# Patient Record
Sex: FEMALE | Race: WHITE | Employment: UNEMPLOYED | URBAN - METROPOLITAN AREA
[De-identification: names, ages, dates, MRNs, and addresses within clinical notes are randomized per-mention and may not be internally consistent; named-entity substitution may affect disease eponyms.]

---
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
## 2023-10-05 ENCOUNTER — OFFICE VISIT (OUTPATIENT)
Dept: OBGYN | Age: 18
End: 2023-10-05
Payer: MEDICAID

## 2023-10-05 ENCOUNTER — HOSPITAL ENCOUNTER (OUTPATIENT)
Age: 18
Setting detail: SPECIMEN
Discharge: HOME | End: 2023-10-05
Payer: MEDICAID

## 2023-10-05 VITALS
SYSTOLIC BLOOD PRESSURE: 118 MMHG | HEIGHT: 67 IN | BODY MASS INDEX: 26.37 KG/M2 | WEIGHT: 168 LBS | DIASTOLIC BLOOD PRESSURE: 76 MMHG

## 2023-10-05 DIAGNOSIS — N89.8 VAGINAL ITCHING: ICD-10-CM

## 2023-10-05 DIAGNOSIS — N89.8 VAGINAL ITCHING: Primary | ICD-10-CM

## 2023-10-05 PROCEDURE — 87660 TRICHOMONAS VAGIN DIR PROBE: CPT

## 2023-10-05 PROCEDURE — 87510 GARDNER VAG DNA DIR PROBE: CPT

## 2023-10-05 PROCEDURE — 99213 OFFICE O/P EST LOW 20 MIN: CPT

## 2023-10-05 PROCEDURE — 87480 CANDIDA DNA DIR PROBE: CPT

## 2023-10-05 PROCEDURE — 1036F TOBACCO NON-USER: CPT

## 2023-10-05 PROCEDURE — 87491 CHLMYD TRACH DNA AMP PROBE: CPT

## 2023-10-05 PROCEDURE — 87591 N.GONORRHOEAE DNA AMP PROB: CPT

## 2023-10-05 PROCEDURE — G8419 CALC BMI OUT NRM PARAM NOF/U: HCPCS

## 2023-10-05 PROCEDURE — G8484 FLU IMMUNIZE NO ADMIN: HCPCS

## 2023-10-05 PROCEDURE — G8427 DOCREV CUR MEDS BY ELIG CLIN: HCPCS

## 2023-10-05 ASSESSMENT — ENCOUNTER SYMPTOMS
SHORTNESS OF BREATH: 0
CONSTIPATION: 0
NAUSEA: 0
ABDOMINAL PAIN: 0
COUGH: 0
CHEST TIGHTNESS: 0
VOMITING: 0
COLOR CHANGE: 0
ABDOMINAL DISTENTION: 0
WHEEZING: 0
DIARRHEA: 0

## 2023-10-05 NOTE — PROGRESS NOTES
CHIEF COMPLAINT:     Chief Complaint   Patient presents with    Vaginal Itching     Patient presents today for vaginal itching and states it feels \"inflamed\". Symptoms for a few days now. HPI:   Alonso Shaw presents today with concerns for vaginal itching for the past couple of days. She reports a small amount of yellow/white discharge and a slight odor. She reports nothing makes it worse or better and she has not tried anything for this at home. She reports she does not have any history of vaginal infections or STIs. She is sexually active with one male partner. She is currently on an OCP. GYNECOLOGIC HISTORY:     Menopause: NA  Patient's last menstrual period was 09/06/2023. Last pap: NA    Sexually active: Yes  New sexual partner: no    STD history: No    Hormone Replacement: no    Birth control method :Yes  Permanent Sterilization: No      REVIEW OF SYSTEMS:  Review of Systems   Constitutional:  Negative for activity change, chills, diaphoresis, fatigue and fever. HENT:  Negative for congestion. Respiratory:  Negative for cough, chest tightness, shortness of breath and wheezing. Cardiovascular:  Negative for chest pain, palpitations and leg swelling. Gastrointestinal:  Negative for abdominal distention, abdominal pain, constipation, diarrhea, nausea and vomiting. Genitourinary:  Positive for vaginal discharge (itching and odor). Negative for dysuria, frequency, hematuria and urgency. Musculoskeletal:  Negative for arthralgias. Skin:  Negative for color change. Neurological:  Negative for dizziness, speech difficulty, weakness, light-headedness, numbness and headaches. Psychiatric/Behavioral:  Negative for agitation, behavioral problems, confusion, dysphoric mood, self-injury and suicidal ideas. The patient is not nervous/anxious.         PHYSICAL EXAM:  Constitutional:   Blood pressure 118/76, height 5' 7\" (1.702 m), weight 168 lb (76.2 kg), last menstrual period 09/06/2023, not

## 2023-10-06 ENCOUNTER — TELEPHONE (OUTPATIENT)
Dept: OBGYN | Age: 18
End: 2023-10-06

## 2023-10-06 LAB
C TRACH DNA SPEC QL PROBE+SIG AMP: NEGATIVE
CANDIDA SPECIES: POSITIVE
GARDNERELLA VAGINALIS: POSITIVE
N GONORRHOEA DNA SPEC QL PROBE+SIG AMP: NEGATIVE
SOURCE: ABNORMAL
SPECIMEN DESCRIPTION: NORMAL
TRICHOMONAS: NEGATIVE

## 2023-10-06 RX ORDER — FLUCONAZOLE 150 MG/1
150 TABLET ORAL ONCE
Qty: 1 TABLET | Refills: 0 | Status: SHIPPED | OUTPATIENT
Start: 2023-10-06 | End: 2023-10-06

## 2023-10-06 RX ORDER — METRONIDAZOLE 500 MG/1
500 TABLET ORAL 2 TIMES DAILY
Qty: 14 TABLET | Refills: 0 | Status: SHIPPED | OUTPATIENT
Start: 2023-10-06 | End: 2023-10-13

## 2023-10-06 NOTE — TELEPHONE ENCOUNTER
Pt called in in regards to [facility] I can see they have been reviewed by Ashley Braxton and meds have been sent. I went over the results with the patient, she voiced understanding and will  the medications.

## 2023-11-20 ENCOUNTER — TELEPHONE (OUTPATIENT)
Dept: OBGYN | Age: 18
End: 2023-11-20

## 2023-11-20 DIAGNOSIS — O20.9 VAGINAL BLEEDING IN PREGNANCY, FIRST TRIMESTER: Primary | ICD-10-CM

## 2023-11-20 NOTE — TELEPHONE ENCOUNTER
Patient called stating she is approximately 7 weeks and had some spotting over the weekend. Patient desires quants to check on pregnancy.

## 2023-11-29 ENCOUNTER — INITIAL PRENATAL (OUTPATIENT)
Dept: OBGYN | Age: 18
End: 2023-11-29
Payer: MEDICAID

## 2023-11-29 ENCOUNTER — HOSPITAL ENCOUNTER (OUTPATIENT)
Age: 18
Setting detail: SPECIMEN
Discharge: HOME | End: 2023-11-29
Payer: MEDICAID

## 2023-11-29 VITALS — SYSTOLIC BLOOD PRESSURE: 106 MMHG | BODY MASS INDEX: 26.78 KG/M2 | WEIGHT: 171 LBS | DIASTOLIC BLOOD PRESSURE: 62 MMHG

## 2023-11-29 DIAGNOSIS — Z34.81 ENCOUNTER FOR SUPERVISION OF OTHER NORMAL PREGNANCY IN FIRST TRIMESTER: ICD-10-CM

## 2023-11-29 DIAGNOSIS — Z3A.08 8 WEEKS GESTATION OF PREGNANCY: ICD-10-CM

## 2023-11-29 DIAGNOSIS — Z32.01 POSITIVE URINE PREGNANCY TEST: ICD-10-CM

## 2023-11-29 DIAGNOSIS — N91.2 AMENORRHEA: ICD-10-CM

## 2023-11-29 DIAGNOSIS — Z34.81 ENCOUNTER FOR SUPERVISION OF OTHER NORMAL PREGNANCY IN FIRST TRIMESTER: Primary | ICD-10-CM

## 2023-11-29 LAB
ABO + RH BLD: NORMAL
BLOOD GROUP ANTIBODIES SERPL: NEGATIVE

## 2023-11-29 PROCEDURE — G8419 CALC BMI OUT NRM PARAM NOF/U: HCPCS | Performed by: OBSTETRICS & GYNECOLOGY

## 2023-11-29 PROCEDURE — 86900 BLOOD TYPING SEROLOGIC ABO: CPT

## 2023-11-29 PROCEDURE — 85025 COMPLETE CBC W/AUTO DIFF WBC: CPT

## 2023-11-29 PROCEDURE — 99211 OFF/OP EST MAY X REQ PHY/QHP: CPT | Performed by: OBSTETRICS & GYNECOLOGY

## 2023-11-29 PROCEDURE — 87086 URINE CULTURE/COLONY COUNT: CPT

## 2023-11-29 PROCEDURE — 86780 TREPONEMA PALLIDUM: CPT

## 2023-11-29 PROCEDURE — 86762 RUBELLA ANTIBODY: CPT

## 2023-11-29 PROCEDURE — 86901 BLOOD TYPING SEROLOGIC RH(D): CPT

## 2023-11-29 PROCEDURE — G8427 DOCREV CUR MEDS BY ELIG CLIN: HCPCS | Performed by: OBSTETRICS & GYNECOLOGY

## 2023-11-29 PROCEDURE — 86803 HEPATITIS C AB TEST: CPT

## 2023-11-29 PROCEDURE — 87591 N.GONORRHOEAE DNA AMP PROB: CPT

## 2023-11-29 PROCEDURE — 87340 HEPATITIS B SURFACE AG IA: CPT

## 2023-11-29 PROCEDURE — H1000 PRENATAL CARE ATRISK ASSESSM: HCPCS | Performed by: OBSTETRICS & GYNECOLOGY

## 2023-11-29 PROCEDURE — 36415 COLL VENOUS BLD VENIPUNCTURE: CPT | Performed by: OBSTETRICS & GYNECOLOGY

## 2023-11-29 PROCEDURE — 87491 CHLMYD TRACH DNA AMP PROBE: CPT

## 2023-11-29 PROCEDURE — 86850 RBC ANTIBODY SCREEN: CPT

## 2023-11-29 PROCEDURE — 87389 HIV-1 AG W/HIV-1&-2 AB AG IA: CPT

## 2023-11-29 NOTE — PROGRESS NOTES
Retardation/Autism Yes Pt's aunt with Autism    Congenital Heart Defect No     Was Person Treated for Fragilex? No     Down Syndrome No     Other Inherited Genetic Chromosomal Disorder? No     Van-Sachs No     Maternal Metabolic Disorder No     Canavan Disease No     Patient or Baby's Father Had Other Defects? No     Familial dysautonomia No     Recurrent Pregnancy Loss or Still Birth? No     Sickle Cell Disease or Trait No     Medications (including supplements, vitamins, herbs or OTC drugs) / illicit / recreational drugs / alcohol since last menstrual period No     Hemophilia No         If Yes, agent(s) and strength/dosage      Muscular Dystrophy No        OB Infection History    Blood Type O Positive     Patient or partner has Hepatitis C No     Live with Someone with or Exposed to TB? No     History of STD/GC/Chlamydia/HPV/Syphilis? No     Patient or Partner has Hx of Genital Herpes? No     History of Chickenpox No     History of MRSA No     Risk of Toxoplasmosis Yes     Risk of CMV No     List of other infections      Rash or Viral Illness Since LMP? No     Additional comments      Patient or partner has Hepatitis B No         Breastfeeding handouts given and reviewed: Yes     If BMI over 35 was HgA1C drawn: N/A     Does pt have a history gestational diabetes :  N/A   If yes did we draw HgA1C: N/A     If history of thyroid problem was TSH drawn: N/A       My chart set up and activated: Yes    If history of preeclampsia did we start baby ASA: N/A    If history of  delivery - did we set up progesterone injections:  N/A    Does pt have a history of DVT? no  If yes start Lovenox 40 mg daily    Is pt allergic to PCN? No:   If yes order U/A to culture and sensitivity if positive. Assessment:      Diagnosis Orders   1.  Encounter for supervision of other normal pregnancy in first trimester  C.trachomatis N.gonorrhoeae DNA, Urine    Culture, Urine    Hepatitis C Antibody    HIV Screen    Prenatal Profile I

## 2023-11-30 LAB
BASOPHILS # BLD: 0.03 K/UL (ref 0–0.2)
BASOPHILS NFR BLD: 0 % (ref 0–2)
CHLAMYDIA DNA UR QL NAA+PROBE: NEGATIVE
EOSINOPHIL # BLD: 0.05 K/UL (ref 0–0.44)
EOSINOPHILS RELATIVE PERCENT: 1 % (ref 1–4)
ERYTHROCYTE [DISTWIDTH] IN BLOOD BY AUTOMATED COUNT: 12.5 % (ref 11.8–14.4)
HBV SURFACE AG SERPL QL IA: NONREACTIVE
HCT VFR BLD AUTO: 38.5 % (ref 36.3–47.1)
HCV AB SERPL QL IA: NONREACTIVE
HGB BLD-MCNC: 13.2 G/DL (ref 11.9–15.1)
HIV 1+2 AB+HIV1 P24 AG SERPL QL IA: NONREACTIVE
IMM GRANULOCYTES # BLD AUTO: <0.03 K/UL (ref 0–0.3)
IMM GRANULOCYTES NFR BLD: 0 %
LYMPHOCYTES NFR BLD: 1.38 K/UL (ref 1.2–5.2)
LYMPHOCYTES RELATIVE PERCENT: 19 % (ref 25–45)
MCH RBC QN AUTO: 30.3 PG (ref 25.2–33.5)
MCHC RBC AUTO-ENTMCNC: 34.3 G/DL (ref 28.4–34.8)
MCV RBC AUTO: 88.5 FL (ref 82.6–102.9)
MICROORGANISM SPEC CULT: NORMAL
MONOCYTES NFR BLD: 0.55 K/UL (ref 0.1–1.4)
MONOCYTES NFR BLD: 8 % (ref 2–8)
N GONORRHOEA DNA UR QL NAA+PROBE: NEGATIVE
NEUTROPHILS NFR BLD: 72 % (ref 34–64)
NEUTS SEG NFR BLD: 5.16 K/UL (ref 1.8–8)
NRBC BLD-RTO: 0 PER 100 WBC
PLATELET # BLD AUTO: 263 K/UL (ref 138–453)
PMV BLD AUTO: 9.8 FL (ref 8.1–13.5)
RBC # BLD AUTO: 4.35 M/UL (ref 3.95–5.11)
RUBV IGG SERPL QL IA: 122.8 IU/ML
SPECIMEN DESCRIPTION: NORMAL
SPECIMEN DESCRIPTION: NORMAL
T PALLIDUM AB SER QL IA: NONREACTIVE
WBC OTHER # BLD: 7.2 K/UL (ref 4.5–13.5)

## 2024-01-02 ENCOUNTER — ROUTINE PRENATAL (OUTPATIENT)
Dept: OBGYN | Age: 19
End: 2024-01-02
Payer: MEDICAID

## 2024-01-02 VITALS — SYSTOLIC BLOOD PRESSURE: 112 MMHG | WEIGHT: 168 LBS | DIASTOLIC BLOOD PRESSURE: 68 MMHG | BODY MASS INDEX: 26.31 KG/M2

## 2024-01-02 DIAGNOSIS — Z34.82 ENCOUNTER FOR SUPERVISION OF OTHER NORMAL PREGNANCY IN SECOND TRIMESTER: ICD-10-CM

## 2024-01-02 DIAGNOSIS — Z33.2: ICD-10-CM

## 2024-01-02 DIAGNOSIS — Z3A.12 12 WEEKS GESTATION OF PREGNANCY: Primary | ICD-10-CM

## 2024-01-02 PROCEDURE — 1036F TOBACCO NON-USER: CPT | Performed by: OBSTETRICS & GYNECOLOGY

## 2024-01-02 PROCEDURE — G8427 DOCREV CUR MEDS BY ELIG CLIN: HCPCS | Performed by: OBSTETRICS & GYNECOLOGY

## 2024-01-02 PROCEDURE — G8484 FLU IMMUNIZE NO ADMIN: HCPCS | Performed by: OBSTETRICS & GYNECOLOGY

## 2024-01-02 PROCEDURE — 99213 OFFICE O/P EST LOW 20 MIN: CPT | Performed by: OBSTETRICS & GYNECOLOGY

## 2024-01-02 PROCEDURE — G8419 CALC BMI OUT NRM PARAM NOF/U: HCPCS | Performed by: OBSTETRICS & GYNECOLOGY

## 2024-01-09 ENCOUNTER — ROUTINE PRENATAL (OUTPATIENT)
Dept: OBGYN | Age: 19
End: 2024-01-09
Payer: MEDICAID

## 2024-01-09 VITALS — WEIGHT: 168.8 LBS | BODY MASS INDEX: 26.44 KG/M2 | DIASTOLIC BLOOD PRESSURE: 64 MMHG | SYSTOLIC BLOOD PRESSURE: 108 MMHG

## 2024-01-09 DIAGNOSIS — O02.1 MISSED AB: Primary | ICD-10-CM

## 2024-01-09 PROCEDURE — G8484 FLU IMMUNIZE NO ADMIN: HCPCS | Performed by: OBSTETRICS & GYNECOLOGY

## 2024-01-09 PROCEDURE — G8419 CALC BMI OUT NRM PARAM NOF/U: HCPCS | Performed by: OBSTETRICS & GYNECOLOGY

## 2024-01-09 PROCEDURE — 99213 OFFICE O/P EST LOW 20 MIN: CPT | Performed by: OBSTETRICS & GYNECOLOGY

## 2024-01-09 PROCEDURE — G8427 DOCREV CUR MEDS BY ELIG CLIN: HCPCS | Performed by: OBSTETRICS & GYNECOLOGY

## 2024-01-09 PROCEDURE — 1036F TOBACCO NON-USER: CPT | Performed by: OBSTETRICS & GYNECOLOGY

## 2024-01-09 RX ORDER — KETOROLAC TROMETHAMINE 10 MG/1
10 TABLET, FILM COATED ORAL EVERY 6 HOURS PRN
Qty: 12 TABLET | Refills: 0 | Status: SHIPPED | OUTPATIENT
Start: 2024-01-09

## 2024-01-09 RX ORDER — MISOPROSTOL 200 UG/1
600 TABLET ORAL ONCE
Qty: 6 TABLET | Refills: 0 | Status: SHIPPED | OUTPATIENT
Start: 2024-01-09 | End: 2024-01-09

## 2024-01-09 ASSESSMENT — ENCOUNTER SYMPTOMS: NAUSEA: 1

## 2024-01-09 NOTE — PROGRESS NOTES
DATE OF VISIT:  1/9/24    PATIENT NAME:  Mckenna Adams     YOB: 2005    REASON FOR VISIT:    Chief Complaint   Patient presents with    Miscarriage     Patient is being seen after US.  She denies any bleeding or cramping at this time.          HISTORY OF PRESENT ILLNESS:  Rpt usn today for viability - no growth in CRL and no FHTs; pt denies cramping or bleeding; disc'd medication v d&e for treatment v expectant management; pt would like to try cytotec        Patient's last menstrual period was 10/01/2023.  Vitals:    01/09/24 1419   BP: 108/64   Weight: 76.6 kg (168 lb 12.8 oz)     Body mass index is 26.44 kg/m².  No Known Allergies  Current Outpatient Medications   Medication Sig Dispense Refill    ketorolac (TORADOL) 10 MG tablet Take 1 tablet by mouth every 6 hours as needed for Pain 12 tablet 0    miSOPROStol (CYTOTEC) 200 MCG tablet Take 3 tablets by mouth once for 1 dose May repeat dose in 24 hours if no bleeding after first dose. 6 tablet 0    Prenatal MV & Min w/FA-DHA ([facility] PO) Take by mouth       No current facility-administered medications for this visit.     Social History     Socioeconomic History    Marital status: Single   Tobacco Use    Smoking status: Never     Passive exposure: Yes    Smokeless tobacco: Never   Vaping Use    Vaping Use: Never used   Substance and Sexual Activity    Alcohol use: No     Alcohol/week: 0.0 standard drinks of alcohol    Drug use: No    Sexual activity: Yes     Partners: Male     Birth control/protection: Condom, Pill       REVIEW OF SYSTEMS:  Review of Systems   Constitutional:  Negative for chills and fever.   Gastrointestinal:  Positive for nausea.   Genitourinary:  Negative for pelvic pain, vaginal bleeding and vaginal discharge.       PHYSICAL EXAM:  /64   Wt 76.6 kg (168 lb 12.8 oz)   LMP 10/01/2023   BMI 26.44 kg/m²   Physical Exam  Constitutional:       Appearance: Normal appearance.   HENT:      Head: Normocephalic and

## 2024-01-17 ENCOUNTER — OFFICE VISIT (OUTPATIENT)
Dept: OBGYN CLINIC | Age: 19
End: 2024-01-17

## 2024-01-17 ENCOUNTER — TELEPHONE (OUTPATIENT)
Dept: OBGYN CLINIC | Age: 19
End: 2024-01-17

## 2024-01-17 VITALS — WEIGHT: 166 LBS | BODY MASS INDEX: 26 KG/M2 | SYSTOLIC BLOOD PRESSURE: 116 MMHG | DIASTOLIC BLOOD PRESSURE: 58 MMHG

## 2024-01-17 DIAGNOSIS — O03.4 INCOMPLETE ABORTION: Primary | ICD-10-CM

## 2024-01-17 RX ORDER — TRANEXAMIC ACID 650 MG/1
1300 TABLET ORAL 3 TIMES DAILY
Status: ON HOLD | COMMUNITY
Start: 2024-01-16 | End: 2024-01-19 | Stop reason: HOSPADM

## 2024-01-17 NOTE — PROGRESS NOTES
with her at the completion of her visit.    ASSESSMENT:      1. Incomplete         PLAN:  No orders of the defined types were placed in this encounter.    Return in about 2 days (around 2024) for suction d&e with usn.       Electronically signed by Chelly Griffin DO on 24

## 2024-01-17 NOTE — H&P (VIEW-ONLY)
DATE OF VISIT:  1/17/24    PATIENT NAME:  Mckenna Adams     YOB: 2005    REASON FOR VISIT:    Chief Complaint   Patient presents with    Follow-up     Patient here for USN follow-up. Patient took Cytotec on Friday.  She had a lot of bleeding on Saturday and was pale and dizzy and large blood clots.  Yesterday she returned to ER because she was having softball sized blood clots. Today bleeding is period like, with just one blood clot that was smaller than a golf ball.         HISTORY OF PRESENT ILLNESS:  Pt with missed ab noted last week; given cytotec; took last fri; went to ED with pain sat and discharged home; went to ED with bleeding last night; hgb had dropped to 9.7 but pt was stable and sent home again; states bleeding has slowed to period like; usn still shows thickened endometrium this am 29 mm (35 mm last gumaro) but still with doppler flow noted; disc'd d&e to evacuate clot and debris; lysteda that was prescribed still not available at pharmacy; pt agreeable to d&e; disc'd procedure; r/b/a reviewed; restrictions and recovery disc'd        Patient's last menstrual period was 10/01/2023.  Vitals:    01/17/24 1031   BP: (!) 116/58   Site: Right Upper Arm   Position: Sitting   Weight: 75.3 kg (166 lb)     Body mass index is 26 kg/m².  No Known Allergies  Current Outpatient Medications   Medication Sig Dispense Refill    Prenatal MV & Min w/FA-DHA ([facility] GUM[facility] PO) Take by mouth      tranexamic acid (LYSTEDA) 650 MG TABS tablet Take 2 tablets by mouth 3 times daily (Patient not taking: Reported on 1/17/2024)      ketorolac (TORADOL) 10 MG tablet Take 1 tablet by mouth every 6 hours as needed for Pain (Patient not taking: Reported on 1/17/2024) 12 tablet 0    miSOPROStol (CYTOTEC) 200 MCG tablet Take 3 tablets by mouth once for 1 dose May repeat dose in 24 hours if no bleeding after first dose. 6 tablet 0     No current facility-administered medications for this visit.     Social History  with her at the completion of her visit.    ASSESSMENT:      1. Incomplete         PLAN:  No orders of the defined types were placed in this encounter.    Return in about 2 days (around 2024) for suction d&e with usn.       Electronically signed by Chelly Griffin DO on 24

## 2024-01-17 NOTE — TELEPHONE ENCOUNTER
Patient was in the office today to see Dr. Ramos for a miscarriage.  She is wanting to proceed with a D&C.  Dr. Ramos signed surgery consents with patient and notified her that surgery would be on 1/19.  I attempted to call patient to confirm surgery details and had to leave a message.  She is scheduled for an Ultrasound Guided D&C at [facility] on 1/19/2024.  She is aware that a nurse from [facility] will call her to complete a phone interview and arrange COVID testing if needed.  She will let me know if she is needing a note for work.  We will also follow up 2-3 weeks after surgery. Patient to call back to schedule.  Patient to call with any further questions/concerns.

## 2024-01-18 ENCOUNTER — ANESTHESIA EVENT (OUTPATIENT)
Dept: OPERATING ROOM | Age: 19
End: 2024-01-18
Payer: MEDICAID

## 2024-01-19 ENCOUNTER — HOSPITAL ENCOUNTER (OUTPATIENT)
Age: 19
Setting detail: OUTPATIENT SURGERY
Discharge: HOME | End: 2024-01-19
Attending: OBSTETRICS & GYNECOLOGY | Admitting: OBSTETRICS & GYNECOLOGY
Payer: MEDICAID

## 2024-01-19 ENCOUNTER — ANESTHESIA (OUTPATIENT)
Dept: OPERATING ROOM | Age: 19
End: 2024-01-19
Payer: MEDICAID

## 2024-01-19 VITALS
HEART RATE: 80 BPM | WEIGHT: 161.2 LBS | SYSTOLIC BLOOD PRESSURE: 102 MMHG | BODY MASS INDEX: 25.3 KG/M2 | TEMPERATURE: 98.7 F | HEIGHT: 67 IN | DIASTOLIC BLOOD PRESSURE: 61 MMHG | RESPIRATION RATE: 16 BRPM | OXYGEN SATURATION: 100 %

## 2024-01-19 DIAGNOSIS — O03.9 MISCARRIAGE: ICD-10-CM

## 2024-01-19 PROCEDURE — 6370000000 HC RX 637 (ALT 250 FOR IP)

## 2024-01-19 PROCEDURE — 59812 TREATMENT OF MISCARRIAGE: CPT | Performed by: OBSTETRICS & GYNECOLOGY

## 2024-01-19 PROCEDURE — 2709999900 HC NON-CHARGEABLE SUPPLY: Performed by: OBSTETRICS & GYNECOLOGY

## 2024-01-19 PROCEDURE — 2500000003 HC RX 250 WO HCPCS

## 2024-01-19 PROCEDURE — 6360000002 HC RX W HCPCS

## 2024-01-19 PROCEDURE — 3600000002 HC SURGERY LEVEL 2 BASE: Performed by: OBSTETRICS & GYNECOLOGY

## 2024-01-19 PROCEDURE — 7100000011 HC PHASE II RECOVERY - ADDTL 15 MIN: Performed by: OBSTETRICS & GYNECOLOGY

## 2024-01-19 PROCEDURE — 2580000003 HC RX 258

## 2024-01-19 PROCEDURE — 3600000012 HC SURGERY LEVEL 2 ADDTL 15MIN: Performed by: OBSTETRICS & GYNECOLOGY

## 2024-01-19 PROCEDURE — 88305 TISSUE EXAM BY PATHOLOGIST: CPT

## 2024-01-19 PROCEDURE — 7100000010 HC PHASE II RECOVERY - FIRST 15 MIN: Performed by: OBSTETRICS & GYNECOLOGY

## 2024-01-19 PROCEDURE — 3700000001 HC ADD 15 MINUTES (ANESTHESIA): Performed by: OBSTETRICS & GYNECOLOGY

## 2024-01-19 PROCEDURE — 3700000000 HC ANESTHESIA ATTENDED CARE: Performed by: OBSTETRICS & GYNECOLOGY

## 2024-01-19 RX ORDER — ONDANSETRON 2 MG/ML
INJECTION INTRAMUSCULAR; INTRAVENOUS PRN
Status: DISCONTINUED | OUTPATIENT
Start: 2024-01-19 | End: 2024-01-19 | Stop reason: SDUPTHER

## 2024-01-19 RX ORDER — SODIUM CHLORIDE 0.9 % (FLUSH) 0.9 %
5-40 SYRINGE (ML) INJECTION EVERY 12 HOURS SCHEDULED
Status: DISCONTINUED | OUTPATIENT
Start: 2024-01-19 | End: 2024-01-19 | Stop reason: HOSPADM

## 2024-01-19 RX ORDER — LIDOCAINE HYDROCHLORIDE 20 MG/ML
INJECTION, SOLUTION EPIDURAL; INFILTRATION; INTRACAUDAL; PERINEURAL PRN
Status: DISCONTINUED | OUTPATIENT
Start: 2024-01-19 | End: 2024-01-19 | Stop reason: SDUPTHER

## 2024-01-19 RX ORDER — METOCLOPRAMIDE HYDROCHLORIDE 5 MG/ML
10 INJECTION INTRAMUSCULAR; INTRAVENOUS
Status: DISCONTINUED | OUTPATIENT
Start: 2024-01-19 | End: 2024-01-19 | Stop reason: HOSPADM

## 2024-01-19 RX ORDER — PROPOFOL 10 MG/ML
INJECTION, EMULSION INTRAVENOUS PRN
Status: DISCONTINUED | OUTPATIENT
Start: 2024-01-19 | End: 2024-01-19 | Stop reason: SDUPTHER

## 2024-01-19 RX ORDER — SODIUM CHLORIDE 0.9 % (FLUSH) 0.9 %
5-40 SYRINGE (ML) INJECTION PRN
Status: DISCONTINUED | OUTPATIENT
Start: 2024-01-19 | End: 2024-01-19 | Stop reason: HOSPADM

## 2024-01-19 RX ORDER — SODIUM CHLORIDE, SODIUM LACTATE, POTASSIUM CHLORIDE, CALCIUM CHLORIDE 600; 310; 30; 20 MG/100ML; MG/100ML; MG/100ML; MG/100ML
INJECTION, SOLUTION INTRAVENOUS CONTINUOUS
Status: DISCONTINUED | OUTPATIENT
Start: 2024-01-19 | End: 2024-01-19 | Stop reason: HOSPADM

## 2024-01-19 RX ORDER — SODIUM CHLORIDE, SODIUM LACTATE, POTASSIUM CHLORIDE, CALCIUM CHLORIDE 600; 310; 30; 20 MG/100ML; MG/100ML; MG/100ML; MG/100ML
INJECTION, SOLUTION INTRAVENOUS CONTINUOUS
Status: DISCONTINUED | OUTPATIENT
Start: 2024-01-19 | End: 2024-01-19 | Stop reason: SDUPTHER

## 2024-01-19 RX ORDER — MIDAZOLAM HYDROCHLORIDE 1 MG/ML
INJECTION INTRAMUSCULAR; INTRAVENOUS PRN
Status: DISCONTINUED | OUTPATIENT
Start: 2024-01-19 | End: 2024-01-19 | Stop reason: SDUPTHER

## 2024-01-19 RX ORDER — FENTANYL CITRATE 50 UG/ML
50 INJECTION, SOLUTION INTRAMUSCULAR; INTRAVENOUS EVERY 5 MIN PRN
Status: DISCONTINUED | OUTPATIENT
Start: 2024-01-19 | End: 2024-01-19 | Stop reason: HOSPADM

## 2024-01-19 RX ORDER — DIMENHYDRINATE 50 MG
50 TABLET ORAL ONCE
Status: COMPLETED | OUTPATIENT
Start: 2024-01-19 | End: 2024-01-19

## 2024-01-19 RX ORDER — ACETAMINOPHEN 325 MG/1
650 TABLET ORAL ONCE
Status: COMPLETED | OUTPATIENT
Start: 2024-01-19 | End: 2024-01-19

## 2024-01-19 RX ORDER — ONDANSETRON 2 MG/ML
4 INJECTION INTRAMUSCULAR; INTRAVENOUS
Status: DISCONTINUED | OUTPATIENT
Start: 2024-01-19 | End: 2024-01-19 | Stop reason: HOSPADM

## 2024-01-19 RX ORDER — KETOROLAC TROMETHAMINE 10 MG/1
10 TABLET, FILM COATED ORAL EVERY 6 HOURS PRN
Qty: 10 TABLET | Refills: 0 | Status: SHIPPED | OUTPATIENT
Start: 2024-01-19 | End: 2025-01-18

## 2024-01-19 RX ORDER — KETOROLAC TROMETHAMINE 30 MG/ML
INJECTION, SOLUTION INTRAMUSCULAR; INTRAVENOUS PRN
Status: DISCONTINUED | OUTPATIENT
Start: 2024-01-19 | End: 2024-01-19 | Stop reason: SDUPTHER

## 2024-01-19 RX ORDER — HYDROCODONE BITARTRATE AND ACETAMINOPHEN 5; 325 MG/1; MG/1
1 TABLET ORAL EVERY 6 HOURS PRN
Status: DISCONTINUED | OUTPATIENT
Start: 2024-01-19 | End: 2024-01-19 | Stop reason: HOSPADM

## 2024-01-19 RX ORDER — SODIUM CHLORIDE 9 MG/ML
INJECTION, SOLUTION INTRAVENOUS PRN
Status: DISCONTINUED | OUTPATIENT
Start: 2024-01-19 | End: 2024-01-19 | Stop reason: HOSPADM

## 2024-01-19 RX ORDER — FENTANYL CITRATE 50 UG/ML
INJECTION, SOLUTION INTRAMUSCULAR; INTRAVENOUS PRN
Status: DISCONTINUED | OUTPATIENT
Start: 2024-01-19 | End: 2024-01-19 | Stop reason: SDUPTHER

## 2024-01-19 RX ORDER — FENTANYL CITRATE 50 UG/ML
25 INJECTION, SOLUTION INTRAMUSCULAR; INTRAVENOUS EVERY 5 MIN PRN
Status: DISCONTINUED | OUTPATIENT
Start: 2024-01-19 | End: 2024-01-19 | Stop reason: HOSPADM

## 2024-01-19 RX ADMIN — ACETAMINOPHEN 650 MG: 325 TABLET ORAL at 10:00

## 2024-01-19 RX ADMIN — PROPOFOL 20 MG: 10 INJECTION, EMULSION INTRAVENOUS at 11:07

## 2024-01-19 RX ADMIN — LIDOCAINE HYDROCHLORIDE 60 MG: 20 INJECTION, SOLUTION EPIDURAL; INFILTRATION; INTRACAUDAL; PERINEURAL at 10:59

## 2024-01-19 RX ADMIN — FENTANYL CITRATE 25 MCG: 50 INJECTION INTRAMUSCULAR; INTRAVENOUS at 10:59

## 2024-01-19 RX ADMIN — SODIUM CHLORIDE, POTASSIUM CHLORIDE, SODIUM LACTATE AND CALCIUM CHLORIDE: 600; 310; 30; 20 INJECTION, SOLUTION INTRAVENOUS at 10:07

## 2024-01-19 RX ADMIN — ONDANSETRON 4 MG: 2 INJECTION INTRAMUSCULAR; INTRAVENOUS at 11:04

## 2024-01-19 RX ADMIN — PROPOFOL 60 MG: 10 INJECTION, EMULSION INTRAVENOUS at 10:59

## 2024-01-19 RX ADMIN — DIMENHYDRINATE 50 MG: 50 TABLET ORAL at 10:00

## 2024-01-19 RX ADMIN — MIDAZOLAM 2 MG: 1 INJECTION INTRAMUSCULAR; INTRAVENOUS at 10:50

## 2024-01-19 RX ADMIN — KETOROLAC TROMETHAMINE 30 MG: 30 INJECTION, SOLUTION INTRAMUSCULAR; INTRAVENOUS at 11:11

## 2024-01-19 RX ADMIN — PROPOFOL 200 MCG/KG/MIN: 10 INJECTION, EMULSION INTRAVENOUS at 11:00

## 2024-01-19 ASSESSMENT — PAIN - FUNCTIONAL ASSESSMENT
PAIN_FUNCTIONAL_ASSESSMENT: FACE, LEGS, ACTIVITY, CRY, AND CONSOLABILITY (FLACC)
PAIN_FUNCTIONAL_ASSESSMENT: NONE - DENIES PAIN
PAIN_FUNCTIONAL_ASSESSMENT: FACE, LEGS, ACTIVITY, CRY, AND CONSOLABILITY (FLACC)
PAIN_FUNCTIONAL_ASSESSMENT: NONE - DENIES PAIN

## 2024-01-19 NOTE — OP NOTE
Preoperative diagnosis: Missed AB    Postoperative diagnosis: Same    Procedure: Suction D&C with ultrasound guidance    Surgeon: Dr. Chelly Griffin    Anesthesia: Gen.    Estimated blood loss: 25 mL    Fluids: Lactated Ringer's    Condition:  stable    Complications:  none    Specimen:  products of conception    Findings: Patient had an approximately 11 cm uterus with products of conception within the uterine cavity and visible at the os    Procedure: The patient was taken to the operating room where she was prepped and draped usual sterile fashion.  A weighted speculum was placed in the patient's vagina and the anterior lip of the cervix was grasped with a single-tooth tenaculum.  Ultrasound was used to confirm the missed AB.  The cervix was progressively dilated.  A 8 mm [place]bourgish suction catheter was introduced to evacuate products of conception from the uterine cavity.  A ring forceps was used to grasp the visible tissue at the cervical os.  After several passes with the suction catheter, a gentle sharp curettage was performed.  The suction catheter was reintroduced to evacuate the remaining blood and clot from the uterine cavity.  Once this is completed, the tenaculum was released with hemostasis being noted and the weighted speculum was removed.  Sponge, lap, needle, and instrument counts were correct ×2.  The patient was taken to the recovery area in apparently stable condition.

## 2024-01-19 NOTE — INTERVAL H&P NOTE
Update History & Physical    The patient's History and Physical of January 17, 2024 was reviewed with the patient and I examined the patient. There was no change. The surgical site was confirmed by the patient and me.     Plan: The risks, benefits, expected outcome, and alternative to the recommended procedure have been discussed with the patient. Patient understands and wants to proceed with the procedure.     Electronically signed by Chelly Griffin DO on 1/19/2024 at 9:21 AM

## 2024-01-19 NOTE — DISCHARGE INSTRUCTIONS
SAME DAY SURGERY DISCHARGE INSTRUCTIONS    1.  Do not drive or operate hazardous machinery for 24 hours.    2.  Do not make important personal or business decisions for 24 hours.    3.  Do not drink alcoholic beverages for 24 hours.    4.  Do not smoke tobacco products for 24 hours.    5.  Eat light foods (Jell-O, soups, etc....) and drink plenty of fluids (water, Sprite, etc...) up to 8 glasses per day, as you can tolerate.    6.  Limit your activities for 24 hours.  Do not engage in heavy work until your surgeon gives you permission.      7.  Patient should not be left alone for 12-24 hours following surgical procedure.    8.  Wash hands before and after incision care.  It is important to practice good personal hygiene during the post op period.    9.  Notify your doctor immediately of any of the following:    Excessive swelling of, or around the wound area.    Redness.    Temperature of 100 degrees (F) or above.    Excessive pain.    Unable to urinate or empty bladder 4-6 hours after surgery.    Excessive bleeding at incision site.      10.  Call your surgeon for any questions regarding your surgery.    POST-OPERATIVE INSTRUCTIONS     Activity as tolerated; may shower and change dressings as needed.    Pain medication to be taken as directed for discomfort.    No sexual relations, douches, tampons, tub baths, swimming in ponds/pools, or hot tubs for 2 weeks or until seen by the doctor for your follow-up appointment.    May have some vaginal drainage for 1-2 weeks, call if excessive.    Call the office at [phone] (Aleksandra)  [phone] (Megha) for an appointment in 2 weeks.

## 2024-01-19 NOTE — ANESTHESIA PRE PROCEDURE
Department of Anesthesiology  Preprocedure Note       Name:  Mckenna Adams   Age:  18 y.o.  :  2005                                          MRN:  [id number]         Date:  2024      Surgeon: Surgeon(s):  Chelly Norris DO    Procedure: Procedure(s):  DILATATION AND CURETTAGE SUCTION-ULTRASOUND GUIDED    Medications prior to admission:   Prior to Admission medications    Medication Sig Start Date End Date Taking? Authorizing Provider   tranexamic acid (LYSTEDA) 650 MG TABS tablet Take 2 tablets by mouth 3 times daily  Patient not taking: Reported on 2024  Provider, MD Gabriella   ketorolac (TORADOL) 10 MG tablet Take 1 tablet by mouth every 6 hours as needed for Pain 24   Chelly Norris DO   miSOPROStol (CYTOTEC) 200 MCG tablet Take 3 tablets by mouth once for 1 dose May repeat dose in 24 hours if no bleeding after first dose. 24  Chelly Norris DO   Prenatal MV & Min w/FA-DHA ([facility] PR[facility]ATAL GUMMY PO) Take 1 tablet by mouth daily (with breakfast)    Provider, MD Gabriella       Current medications:    Current Facility-Administered Medications   Medication Dose Route Frequency Provider Last Rate Last Admin   • lactated ringers IV soln infusion   IntraVENous Continuous Kaelyn Curtis PA-C 125 mL/hr at 24 1007 New Bag at 24 1007   • sodium chloride flush 0.9 % injection 5-40 mL  5-40 mL IntraVENous 2 times per day Kaelyn Curtis PA-C       • sodium chloride flush 0.9 % injection 5-40 mL  5-40 mL IntraVENous PRN Kaelyn Curtis PA-C       • 0.9 % sodium chloride infusion   IntraVENous PRN Kaelyn Curtis PA-C           Allergies:  No Known Allergies    Problem List:    Patient Active Problem List   Diagnosis Code   • Term pregnancy Z34.90   • 40 weeks gestation of pregnancy Z3A.40       Past Medical History:  History reviewed. No pertinent past medical history.    Past Surgical History:        Procedure

## 2024-01-19 NOTE — ANESTHESIA POSTPROCEDURE EVALUATION
Department of Anesthesiology  Postprocedure Note    Patient: Mckenna Adams  MRN: [id number]  YOB: 2005  Date of evaluation: 1/19/2024    Procedure Summary       Date: 01/19/24 Room / Location: [address]    Anesthesia Start: 1054 Anesthesia Stop: 1126    Procedure: DILATATION AND CURETTAGE SUCTION-ULTRASOUND GUIDED (Uterus) Diagnosis:       Miscarriage      (Miscarriage [O03.9])    Surgeons: Chelly Norris DO Responsible Provider: Naty Campos APRN - CRNA    Anesthesia Type: general, TIVA ASA Status: 2            Anesthesia Type: No value filed.    Manda Phase I: Manda Score: 10    Manda Phase II: Manda Score: 10    Anesthesia Post Evaluation    Patient location during evaluation: PACU  Patient participation: complete - patient participated  Level of consciousness: awake  Airway patency: patent  Nausea & Vomiting: no vomiting and no nausea  Cardiovascular status: blood pressure returned to baseline and hemodynamically stable  Respiratory status: acceptable, spontaneous ventilation and room air  Hydration status: stable  Multimodal analgesia pain management approach  Pain management: satisfactory to patient        No notable events documented.

## 2024-01-19 NOTE — PROGRESS NOTES
DC Instructions given to patient and family. Both verbalized understanding. Patient expressed readiness to be discharged.    Discharge Criteria    Inpatients must meet Criteria 1 through 7. All other patients are either YES or N/A. If a NO is chosen then Anesthesia or Surgeon must be notified.      1.  Minimum 30 minutes after last dose of sedative medication.    Yes      2.  Systolic BP between 90 - 160. Diastolic BP between 60 - 90.    Yes      3.  Pulse between 60 - 120    Yes      4.  Respirations between 8 - 25.    Yes      5.  SpO2 92% - 100%.    Yes      6.  Able to cough and swallow or return to baseline function.    Yes      7.  Alert and oriented or return to baseline mental status.    Yes      8.  Demonstrates controlled, coordinated movements, ambulates with steady gait, or return to baseline activity function.    Yes      9.  Minimal or no pain or nausea, or at a level tolerable and acceptable to patient.    Yes      10. Takes and retains oral fluids as allowed.    Yes      11. Procedural / perioperative site stable.  Minimal or no bleeding.    Yes          12. If GI endoscopy procedure, minimal or no abdominal distention or passing flatus.    N/A      13. Written discharge instructions and emergency telephone number provided.    Yes      14. Accompanied by a responsible adult.    Yes

## 2024-01-23 LAB — SURGICAL PATHOLOGY REPORT: NORMAL

## 2024-01-31 ENCOUNTER — OFFICE VISIT (OUTPATIENT)
Dept: OBGYN | Age: 19
End: 2024-01-31

## 2024-01-31 VITALS
HEIGHT: 67 IN | DIASTOLIC BLOOD PRESSURE: 70 MMHG | WEIGHT: 161 LBS | BODY MASS INDEX: 25.27 KG/M2 | SYSTOLIC BLOOD PRESSURE: 118 MMHG

## 2024-01-31 DIAGNOSIS — Z09 POSTOPERATIVE EXAMINATION: Primary | ICD-10-CM

## 2024-01-31 PROCEDURE — 99024 POSTOP FOLLOW-UP VISIT: CPT

## 2024-01-31 ASSESSMENT — PATIENT HEALTH QUESTIONNAIRE - PHQ9
2. FEELING DOWN, DEPRESSED OR HOPELESS: 0
SUM OF ALL RESPONSES TO PHQ QUESTIONS 1-9: 0
SUM OF ALL RESPONSES TO PHQ9 QUESTIONS 1 & 2: 0
SUM OF ALL RESPONSES TO PHQ QUESTIONS 1-9: 0
1. LITTLE INTEREST OR PLEASURE IN DOING THINGS: 0
SUM OF ALL RESPONSES TO PHQ QUESTIONS 1-9: 0
SUM OF ALL RESPONSES TO PHQ QUESTIONS 1-9: 0

## 2024-01-31 NOTE — PROGRESS NOTES
Postop Progress Note    Subjective    Mckenna Adams presents to the office for postop follow up.  Chief Complaint   Patient presents with    Post-Op Check     Pt had Suction D&C with ultrasound guidance preformed on 1/19/24.     Objective    Vitals:    01/31/24 1053   BP: 118/70     General: alert, cooperative and no distress    Assessment  Doing well postoperatively.  Denies abnormal bleeding, discharge, cramping.  Reports that she is feeling well.  Denies any other concerns or complaints today.    Plan  1. Continue any current medications  2. Wound care discussed  3. Pt is to increase activities as tolerated  4. Usual diet  5. Follow up: as needed    Electronically signed by Kaelyn Curtis PA-C on 2/5/2024 at 9:58 AM

## 2024-02-29 ENCOUNTER — PATIENT MESSAGE (OUTPATIENT)
Dept: OBGYN CLINIC | Age: 19
End: 2024-02-29

## 2024-02-29 DIAGNOSIS — O03.4 INCOMPLETE ABORTION: Primary | ICD-10-CM

## 2024-02-29 NOTE — TELEPHONE ENCOUNTER
Patient sent a message asking for OCP. LMP Feb. 25th. She had SAB in Jan. With D&C.  She never repeated a quant to assure negative.  I confirmed with BRIGHT Voss and told patient she needed to repeat quant. I told her I would send you a message letting you know that she was asking for ocp with negative result. (This patient is usually seen in the [place] office) She is scheduled in June for annual.

## 2024-03-01 RX ORDER — NORETHINDRONE ACETATE AND ETHINYL ESTRADIOL 1MG-20(21)
1 KIT ORAL DAILY
Qty: 1 PACKET | Refills: 3 | Status: SHIPPED | OUTPATIENT
Start: 2024-03-01

## 2024-06-24 RX ORDER — NORETHINDRONE ACETATE AND ETHINYL ESTRADIOL 1MG-20(21)
1 KIT ORAL DAILY
Qty: 28 TABLET | Refills: 2 | Status: SHIPPED | OUTPATIENT
Start: 2024-06-24

## (undated) DEVICE — PREMIUM DRY TRAY LF: Brand: MEDLINE INDUSTRIES, INC.

## (undated) DEVICE — PAD N ADH W3XL4IN POLY COT SFT PERF FLM EASILY CUT ABSRB

## (undated) DEVICE — GOWN,SIRUS,POLYRNF,BRTHSLV,XL,30/CS: Brand: MEDLINE

## (undated) DEVICE — SOLUTION SCRB 4OZ 4% CHG H2O AIDED FOR PREOPERATIVE SKIN

## (undated) DEVICE — CURETTE VAC CRV 8 MM INDIVIDUALLY WRP STRL VACURET

## (undated) DEVICE — PAD,SANITARY,11 IN,MAXI,N-STRL,IND WRAP: Brand: MEDLINE

## (undated) DEVICE — GLOVE SURG SZ 65 CRM LTX FREE POLYISOPRENE POLYMER BEAD ANTI

## (undated) DEVICE — MERCY TIFFIN LITHOTOMY: Brand: MEDLINE INDUSTRIES, INC.

## (undated) DEVICE — UNDERPANTS INCONT XL 45-70IN KNIT SEAMLESS DSGN COLOR-CODED

## (undated) DEVICE — PREP PAD BNS: Brand: CONVERTORS

## (undated) DEVICE — TOWEL,OR,DSP,ST,NATURAL,DLX,4/PK,20PK/CS: Brand: MEDLINE